# Patient Record
Sex: FEMALE | URBAN - METROPOLITAN AREA
[De-identification: names, ages, dates, MRNs, and addresses within clinical notes are randomized per-mention and may not be internally consistent; named-entity substitution may affect disease eponyms.]

---

## 2023-12-27 ENCOUNTER — TELEPHONE (OUTPATIENT)
Age: 28
End: 2023-12-27

## 2023-12-27 NOTE — TELEPHONE ENCOUNTER
----- Message from DAVE Kamara CNP sent at 12/27/2023  8:26 AM EST -----  Please let the patient know that the test results were positive for a UTI. Have the patient finish the antibiotic that they were prescribed at their visit.      Left voice mail for patient to return call for urine cx results